# Patient Record
Sex: FEMALE | Race: WHITE | NOT HISPANIC OR LATINO | Employment: OTHER | URBAN - METROPOLITAN AREA
[De-identification: names, ages, dates, MRNs, and addresses within clinical notes are randomized per-mention and may not be internally consistent; named-entity substitution may affect disease eponyms.]

---

## 2017-08-04 RX ORDER — LOVASTATIN 40 MG/1
40 TABLET ORAL NIGHTLY
Qty: 90 TABLET | Refills: 3 | Status: SHIPPED | OUTPATIENT
Start: 2017-08-04 | End: 2020-06-02

## 2017-08-04 RX ORDER — METFORMIN HYDROCHLORIDE 500 MG/1
500 TABLET ORAL 2 TIMES DAILY WITH MEALS
Qty: 180 TABLET | Refills: 3 | Status: SHIPPED | OUTPATIENT
Start: 2017-08-04 | End: 2020-06-02

## 2017-08-04 RX ORDER — LISINOPRIL 30 MG/1
30 TABLET ORAL DAILY
Qty: 90 TABLET | Refills: 3 | Status: SHIPPED | OUTPATIENT
Start: 2017-08-04 | End: 2020-06-02

## 2017-08-04 RX ORDER — MELOXICAM 15 MG/1
15 TABLET ORAL DAILY
Qty: 30 TABLET | Refills: 1 | Status: SHIPPED | OUTPATIENT
Start: 2017-08-04 | End: 2017-09-03

## 2020-02-08 ENCOUNTER — HOSPITAL ENCOUNTER (EMERGENCY)
Facility: HOSPITAL | Age: 55
Discharge: HOME | End: 2020-02-09
Attending: EMERGENCY MEDICINE

## 2020-02-08 DIAGNOSIS — R05.9 COUGH: ICD-10-CM

## 2020-02-08 DIAGNOSIS — J10.1 INFLUENZA A: Primary | ICD-10-CM

## 2020-02-08 LAB
INFLUENZA A, MOLECULAR: POSITIVE
INFLUENZA B, MOLECULAR: NEGATIVE
SPECIMEN SOURCE: ABNORMAL

## 2020-02-08 PROCEDURE — 99284 EMERGENCY DEPT VISIT MOD MDM: CPT | Mod: 25

## 2020-02-08 PROCEDURE — 25000003 PHARM REV CODE 250: Performed by: EMERGENCY MEDICINE

## 2020-02-08 PROCEDURE — 87502 INFLUENZA DNA AMP PROBE: CPT

## 2020-02-08 RX ORDER — ACETAMINOPHEN 325 MG/1
650 TABLET ORAL
Status: COMPLETED | OUTPATIENT
Start: 2020-02-08 | End: 2020-02-08

## 2020-02-08 RX ADMIN — ACETAMINOPHEN 650 MG: 325 TABLET ORAL at 10:02

## 2020-02-09 VITALS
HEART RATE: 90 BPM | RESPIRATION RATE: 17 BRPM | SYSTOLIC BLOOD PRESSURE: 129 MMHG | OXYGEN SATURATION: 98 % | TEMPERATURE: 99 F | BODY MASS INDEX: 34.38 KG/M2 | WEIGHT: 194 LBS | HEIGHT: 63 IN | DIASTOLIC BLOOD PRESSURE: 72 MMHG

## 2020-02-09 RX ORDER — ONDANSETRON 4 MG/1
4 TABLET, ORALLY DISINTEGRATING ORAL EVERY 8 HOURS PRN
Qty: 9 TABLET | Refills: 0 | Status: SHIPPED | OUTPATIENT
Start: 2020-02-09 | End: 2020-02-12

## 2020-02-09 RX ORDER — PROMETHAZINE HYDROCHLORIDE AND DEXTROMETHORPHAN HYDROBROMIDE 6.25; 15 MG/5ML; MG/5ML
5 SYRUP ORAL EVERY 6 HOURS PRN
Qty: 1 BOTTLE | Refills: 0 | Status: SHIPPED | OUTPATIENT
Start: 2020-02-09 | End: 2020-06-02

## 2020-02-09 NOTE — ED PROVIDER NOTES
Encounter Date: 2/8/2020    SCRIBE #1 NOTE: I, Ariadna Billings, am scribing for, and in the presence of,  Dr. Choudhury. I have scribed the entire note.       History     Chief Complaint   Patient presents with    Fever     Patient presents to the ED with reports of having fever and cough that started yesterday. States having a low grade temperature that started last night. States home temp was 100.5 prior to arrival. Treated with 600 mg of ibuprofen at 7:30 pm tonight. States she is currently taking augmentin for a cyst removal recently and is almost done with medication.     Cough     This is a 54 y.o. female who  has no past medical history on file. presents with chief complaint of non productive cough and fever with onset yesterday. Patient reports taking her temperature at home last night of 99.8F. She notes taking Ibuprofen which improved symptoms. Prior to arrival, patient had a temperature of 100.5F and treated it with 600 mg of Ibuprofen. She also notes having chest pain, back pain and throat pain, which she describes as burning sensation, secondary to cough. Patient has taken Tessalon perles for cough with mild relief. Patient has no further complaints at this time. Patient admits to smoking a pack of cigarettes a day for the past 30 years. Of note, patient recently had a cyst removed on 02/04/20 and placed on 875 mg Augmentin for 7 days.     The history is provided by the patient.     Review of patient's allergies indicates:   Allergen Reactions    Dilaudid [hydromorphone (bulk)] Nausea And Vomiting    Doxycycline     Flagyl [metronidazole hcl]     Soma [carisoprodol]      No past medical history on file.  No past surgical history on file.  No family history on file.  Social History     Tobacco Use    Smoking status: Not on file   Substance Use Topics    Alcohol use: Not on file    Drug use: Not on file     Review of Systems   Constitutional: Positive for fever. Negative for chills and fatigue.    HENT: Negative for facial swelling, trouble swallowing and voice change.    Eyes: Negative for photophobia, pain and redness.   Respiratory: Positive for cough (non productive). Negative for choking and shortness of breath.    Cardiovascular: Negative for chest pain, palpitations and leg swelling.   Gastrointestinal: Negative for abdominal pain, diarrhea, nausea and vomiting.   Genitourinary: Negative for difficulty urinating, frequency and urgency.   Musculoskeletal: Positive for myalgias. Negative for back pain, neck pain and neck stiffness.   Neurological: Negative for seizures, speech difficulty, light-headedness, numbness and headaches.   All other systems reviewed and are negative.      Physical Exam     Initial Vitals [02/08/20 2054]   BP Pulse Resp Temp SpO2   138/78 100 20 (!) 100.5 °F (38.1 °C) 95 %      MAP       --         Physical Exam    Nursing note and vitals reviewed.  Constitutional: She appears well-developed and well-nourished. No distress.   Non toxic appearing   No acute distress noted    HENT:   Head: Normocephalic and atraumatic.   Right Ear: Tympanic membrane and external ear normal. No drainage.   Left Ear: Tympanic membrane and external ear normal. No drainage.   Nose: Mucosal edema present. Right sinus exhibits no maxillary sinus tenderness and no frontal sinus tenderness. Left sinus exhibits no frontal sinus tenderness.   Mouth/Throat: Uvula is midline, oropharynx is clear and moist and mucous membranes are normal. No oropharyngeal exudate, posterior oropharyngeal edema, posterior oropharyngeal erythema or tonsillar abscesses.   Nasal congestion. Mild erythema to posterior oropharynx.    Eyes: Conjunctivae and EOM are normal. Pupils are equal, round, and reactive to light.   Neck: Normal range of motion. Neck supple.   Cardiovascular: Normal rate, regular rhythm, normal heart sounds and intact distal pulses.   Pulmonary/Chest: No respiratory distress. She has no wheezes. She has no  "rhonchi. She has no rales.   Coarse breath sounds. No wheezing.    Abdominal: Soft. Bowel sounds are normal. She exhibits no distension. There is no tenderness.   Musculoskeletal: Normal range of motion. She exhibits no edema or tenderness.   Neurological: She is alert and oriented to person, place, and time. She has normal strength. No cranial nerve deficit.   Skin: Skin is warm and dry. Capillary refill takes less than 2 seconds.         ED Course   Procedures  Labs Reviewed   INFLUENZA A & B BY MOLECULAR - Abnormal; Notable for the following components:       Result Value    Influenza A, Molecular Positive (*)     All other components within normal limits          X-Rays:   Independently Interpreted Readings:   Other Readings:  Reviewed by myself, read by radiology.     Imaging Results          X-Ray Chest PA And Lateral (Final result)  Result time 02/09/20 00:13:22    Final result by Tequila Guajardo MD (02/09/20 00:13:22)                 Impression:      No acute intrathoracic abnormality.      Electronically signed by: Tequila Guajardo  Date:    02/09/2020  Time:    00:13             Narrative:    EXAMINATION:  CHEST PA AND LATERAL    CLINICAL HISTORY:  Cough    TECHNIQUE:  PA and lateral chest radiograph    COMPARISON:  <None.>    FINDINGS:  The cardiac silhouette is within normal limits.   There is no focal consolidation, pneumothorax, or pleural effusion.                              Medical Decision Making:   Clinical Tests:   Lab Tests: Ordered and Reviewed  Radiological Study: Ordered and Reviewed  ED Management:  - CXR negative for acute cardiopulmonary process per my interpretation, final radiology read  - Influenza A & B positive for Influenza A  - Pt out of so-called "window"" for treatment with antiviral  - Will treat symptomatically with rx for cough suppressant; will also recommend rest, fluid hydration  - VSS throughout ED; pt is non-toxic   - Pt stable for discharge  - No further intervention " is indicated at this time after having taken into account the patient's history, physical exam findings, and empirical and objective data obtained during the patient's emergency department workup.   - The patient is at low risk for an emergent medical condition at this time, and I am of the belief that that it is safe to discharge the patient from the emergency department.   - The patient is instructed to follow up as outpatient as indicated on the discharge paperwork.    - I have discussed the specifics of the workup with the patient and the patient has verbalized understanding of the details of the workup, the diagnosis, the treatment plan, and the need for outpatient follow-up.    - Although the patient has no emergent etiology today this does not preclude the development of an emergent condition so, in addition, I have advised the patient that they can return to the ED and/or activate EMS at any time with worsening of their symptoms, change of their symptoms, or with any other medical complaint.    - The patient remained comfortable and stable during their visit in the ED.    - Discharge and follow-up instructions discussed with the patient who expressed understanding and willingness to comply with my recommendations.  - Results of all emergency department tests  discussed thoroughly with patient; all patient questions answered; pt in agreement with plan  - Pt instructed to follow up with PCP in 2-3 days for recheck of today's complaints  - Pt given strict emergency department return precautions for any new or worsening of symptoms  - Pt discharged from the emergency department in stable condition, in no acute distress                                     Clinical Impression:       ICD-10-CM ICD-9-CM   1. Influenza A J10.1 487.1   2. Cough R05 786.2         IRicci,  personally performed the services described in this documentation. All medical record entries made by the scribe were at my direction and  in my presence.  I have reviewed the chart and agree that the record reflects my personal performance and is accurate and complete. Ricci Choudhury M.D. 1:34 AM02/09/2020                 Ricci Choudhury MD  02/09/20 0135

## 2020-06-02 ENCOUNTER — CLINICAL SUPPORT (OUTPATIENT)
Dept: PRIMARY CARE CLINIC | Facility: CLINIC | Age: 55
End: 2020-06-02
Payer: MEDICARE

## 2020-06-02 ENCOUNTER — OFFICE VISIT (OUTPATIENT)
Dept: PRIMARY CARE CLINIC | Facility: CLINIC | Age: 55
End: 2020-06-02
Payer: MEDICARE

## 2020-06-02 VITALS
SYSTOLIC BLOOD PRESSURE: 146 MMHG | WEIGHT: 189.06 LBS | HEIGHT: 63 IN | OXYGEN SATURATION: 98 % | HEART RATE: 86 BPM | TEMPERATURE: 99 F | RESPIRATION RATE: 18 BRPM | DIASTOLIC BLOOD PRESSURE: 80 MMHG | BODY MASS INDEX: 33.5 KG/M2

## 2020-06-02 DIAGNOSIS — E11.9 TYPE 2 DIABETES MELLITUS WITHOUT COMPLICATION, WITHOUT LONG-TERM CURRENT USE OF INSULIN: Primary | ICD-10-CM

## 2020-06-02 DIAGNOSIS — Z13.6 ENCOUNTER FOR LIPID SCREENING FOR CARDIOVASCULAR DISEASE: ICD-10-CM

## 2020-06-02 DIAGNOSIS — R51.9 NONINTRACTABLE HEADACHE, UNSPECIFIED CHRONICITY PATTERN, UNSPECIFIED HEADACHE TYPE: ICD-10-CM

## 2020-06-02 DIAGNOSIS — Z12.31 ENCOUNTER FOR SCREENING MAMMOGRAM FOR BREAST CANCER: ICD-10-CM

## 2020-06-02 DIAGNOSIS — Z13.220 ENCOUNTER FOR LIPID SCREENING FOR CARDIOVASCULAR DISEASE: ICD-10-CM

## 2020-06-02 DIAGNOSIS — Z20.822 EXPOSURE TO COVID-19 VIRUS: ICD-10-CM

## 2020-06-02 DIAGNOSIS — I10 ESSENTIAL HYPERTENSION: ICD-10-CM

## 2020-06-02 DIAGNOSIS — Z12.11 ENCOUNTER FOR SCREENING COLONOSCOPY: ICD-10-CM

## 2020-06-02 DIAGNOSIS — Z72.0 TOBACCO USE: ICD-10-CM

## 2020-06-02 DIAGNOSIS — E11.9 TYPE 2 DIABETES MELLITUS WITHOUT COMPLICATION, WITHOUT LONG-TERM CURRENT USE OF INSULIN: ICD-10-CM

## 2020-06-02 DIAGNOSIS — M51.16 LUMBAR DISC DISEASE WITH RADICULOPATHY: ICD-10-CM

## 2020-06-02 LAB
ALBUMIN SERPL BCP-MCNC: 4.9 G/DL (ref 3.5–5.2)
ALBUMIN/CREAT UR: 200 UG/MG (ref 0–30)
ALP SERPL-CCNC: 84 U/L (ref 38–126)
ALT SERPL W/O P-5'-P-CCNC: 23 U/L (ref 14–54)
ANION GAP SERPL CALC-SCNC: 14 MMOL/L (ref 8–16)
AST SERPL-CCNC: 20 U/L (ref 15–41)
BASOPHILS # BLD AUTO: 0.1 K/UL (ref 0–0.2)
BASOPHILS NFR BLD: 0.8 % (ref 0–1.9)
BILIRUB SERPL-MCNC: 0.6 MG/DL (ref 0.3–1.2)
BILIRUB SERPL-MCNC: ABNORMAL MG/DL
BLOOD URINE, POC: ABNORMAL
BUN SERPL-MCNC: 14 MG/DL (ref 6–20)
CALCIUM SERPL-MCNC: 10.1 MG/DL (ref 8.6–10)
CHLORIDE SERPL-SCNC: 97 MMOL/L (ref 101–111)
CHOLEST SERPL-MCNC: 219 MG/DL (ref 80–200)
CHOLEST/HDLC SERPL: 7.3 {RATIO} (ref 2–5)
CO2 SERPL-SCNC: 26 MMOL/L (ref 23–29)
COLOR, POC UA: YELLOW
CREAT SERPL-MCNC: 0.8 MG/DL (ref 0.5–1.4)
CREAT UR-MCNC: 92 MG/DL (ref 15–325)
DIFFERENTIAL METHOD: NORMAL
EOSINOPHIL # BLD AUTO: 0.3 K/UL (ref 0–0.5)
EOSINOPHIL NFR BLD: 3.3 % (ref 0–8)
ERYTHROCYTE [DISTWIDTH] IN BLOOD BY AUTOMATED COUNT: 13.1 % (ref 11.5–14.5)
EST. GFR  (AFRICAN AMERICAN): >60 ML/MIN/1.73 M^2
EST. GFR  (NON AFRICAN AMERICAN): >60 ML/MIN/1.73 M^2
ESTIMATED AVG GLUCOSE: 206 MG/DL (ref 68–131)
GLUCOSE SERPL-MCNC: 203 MG/DL (ref 74–118)
GLUCOSE UR QL STRIP: NORMAL
HBA1C MFR BLD HPLC: 8.8 % (ref 4–5.6)
HCT VFR BLD AUTO: 44.9 % (ref 37–48.5)
HDLC SERPL-MCNC: 30 MG/DL (ref 40–75)
HDLC SERPL: 13.7 % (ref 20–50)
HGB BLD-MCNC: 15.5 G/DL (ref 12–16)
KETONES UR QL STRIP: ABNORMAL
LDLC SERPL CALC-MCNC: ABNORMAL MG/DL
LEUKOCYTE ESTERASE URINE, POC: ABNORMAL
LYMPHOCYTES # BLD AUTO: 2.7 K/UL (ref 1–4.8)
LYMPHOCYTES NFR BLD: 32.4 % (ref 18–48)
MCH RBC QN AUTO: 30.4 PG (ref 27–31)
MCHC RBC AUTO-ENTMCNC: 34.4 G/DL (ref 32–36)
MCV RBC AUTO: 88 FL (ref 82–98)
MICROALBUMIN UR DL<=1MG/L-MCNC: 184 UG/ML
MONOCYTES # BLD AUTO: 0.3 K/UL (ref 0.3–1)
MONOCYTES NFR BLD: 4.1 % (ref 4–15)
NEUTROPHILS # BLD AUTO: 4.9 K/UL (ref 1.8–7.7)
NEUTROPHILS NFR BLD: 59.4 % (ref 38–73)
NITRITE, POC UA: ABNORMAL
NONHDLC SERPL-MCNC: 189 MG/DL
PH, POC UA: 6
PLATELET # BLD AUTO: 247 K/UL (ref 150–350)
PMV BLD AUTO: 9.4 FL (ref 9.2–12.9)
POTASSIUM SERPL-SCNC: 3.8 MMOL/L (ref 3.5–5.1)
PROT SERPL-MCNC: 7.9 G/DL (ref 6–8.4)
PROTEIN, POC: ABNORMAL
RBC # BLD AUTO: 5.08 M/UL (ref 4–5.4)
SARS-COV-2 IGG SERPLBLD QL IA.RAPID: NEGATIVE
SODIUM SERPL-SCNC: 137 MMOL/L (ref 136–145)
SPECIFIC GRAVITY, POC UA: 1.01
TRIGL SERPL-MCNC: 562 MG/DL (ref 30–150)
TSH SERPL DL<=0.005 MIU/L-ACNC: 3.47 UIU/ML (ref 0.45–5.33)
UROBILINOGEN, POC UA: NORMAL
WBC # BLD AUTO: 8.3 K/UL (ref 3.9–12.7)

## 2020-06-02 PROCEDURE — 99999 PR PBB SHADOW E&M-EST. PATIENT-LVL V: CPT | Mod: PBBFAC,,, | Performed by: INTERNAL MEDICINE

## 2020-06-02 PROCEDURE — 81002 POCT URINE DIPSTICK WITHOUT MICROSCOPE: ICD-10-PCS | Mod: S$GLB,,, | Performed by: INTERNAL MEDICINE

## 2020-06-02 PROCEDURE — 80061 LIPID PANEL: CPT

## 2020-06-02 PROCEDURE — 93010 EKG 12-LEAD: ICD-10-PCS | Mod: S$GLB,,, | Performed by: INTERNAL MEDICINE

## 2020-06-02 PROCEDURE — 36415 PR COLLECTION VENOUS BLOOD,VENIPUNCTURE: ICD-10-PCS | Mod: S$GLB,,, | Performed by: INTERNAL MEDICINE

## 2020-06-02 PROCEDURE — 36415 COLL VENOUS BLD VENIPUNCTURE: CPT | Mod: S$GLB,,, | Performed by: INTERNAL MEDICINE

## 2020-06-02 PROCEDURE — 99214 OFFICE O/P EST MOD 30 MIN: CPT | Mod: 25,S$GLB,, | Performed by: INTERNAL MEDICINE

## 2020-06-02 PROCEDURE — 93010 ELECTROCARDIOGRAM REPORT: CPT | Mod: S$GLB,,, | Performed by: INTERNAL MEDICINE

## 2020-06-02 PROCEDURE — 3008F PR BODY MASS INDEX (BMI) DOCUMENTED: ICD-10-PCS | Mod: CPTII,S$GLB,, | Performed by: INTERNAL MEDICINE

## 2020-06-02 PROCEDURE — 84443 ASSAY THYROID STIM HORMONE: CPT

## 2020-06-02 PROCEDURE — 83036 HEMOGLOBIN GLYCOSYLATED A1C: CPT

## 2020-06-02 PROCEDURE — 81002 URINALYSIS NONAUTO W/O SCOPE: CPT | Mod: S$GLB,,, | Performed by: INTERNAL MEDICINE

## 2020-06-02 PROCEDURE — 80053 COMPREHEN METABOLIC PANEL: CPT

## 2020-06-02 PROCEDURE — 99214 PR OFFICE/OUTPT VISIT, EST, LEVL IV, 30-39 MIN: ICD-10-PCS | Mod: 25,S$GLB,, | Performed by: INTERNAL MEDICINE

## 2020-06-02 PROCEDURE — 3052F HG A1C>EQUAL 8.0%<EQUAL 9.0%: CPT | Mod: CPTII,S$GLB,, | Performed by: INTERNAL MEDICINE

## 2020-06-02 PROCEDURE — 3052F PR MOST RECENT HEMOGLOBIN A1C LEVEL 8.0 - < 9.0%: ICD-10-PCS | Mod: CPTII,S$GLB,, | Performed by: INTERNAL MEDICINE

## 2020-06-02 PROCEDURE — 99499 RISK ADDL DX/OHS AUDIT: ICD-10-PCS | Mod: S$GLB,,, | Performed by: INTERNAL MEDICINE

## 2020-06-02 PROCEDURE — 3077F SYST BP >= 140 MM HG: CPT | Mod: CPTII,S$GLB,, | Performed by: INTERNAL MEDICINE

## 2020-06-02 PROCEDURE — 99999 PR PBB SHADOW E&M-EST. PATIENT-LVL V: ICD-10-PCS | Mod: PBBFAC,,, | Performed by: INTERNAL MEDICINE

## 2020-06-02 PROCEDURE — 3079F PR MOST RECENT DIASTOLIC BLOOD PRESSURE 80-89 MM HG: ICD-10-PCS | Mod: CPTII,S$GLB,, | Performed by: INTERNAL MEDICINE

## 2020-06-02 PROCEDURE — 3079F DIAST BP 80-89 MM HG: CPT | Mod: CPTII,S$GLB,, | Performed by: INTERNAL MEDICINE

## 2020-06-02 PROCEDURE — 3008F BODY MASS INDEX DOCD: CPT | Mod: CPTII,S$GLB,, | Performed by: INTERNAL MEDICINE

## 2020-06-02 PROCEDURE — 93005 ELECTROCARDIOGRAM TRACING: CPT | Mod: S$GLB,,, | Performed by: INTERNAL MEDICINE

## 2020-06-02 PROCEDURE — 82043 UR ALBUMIN QUANTITATIVE: CPT

## 2020-06-02 PROCEDURE — 85025 COMPLETE CBC W/AUTO DIFF WBC: CPT

## 2020-06-02 PROCEDURE — 3077F PR MOST RECENT SYSTOLIC BLOOD PRESSURE >= 140 MM HG: ICD-10-PCS | Mod: CPTII,S$GLB,, | Performed by: INTERNAL MEDICINE

## 2020-06-02 PROCEDURE — 99499 UNLISTED E&M SERVICE: CPT | Mod: S$GLB,,, | Performed by: INTERNAL MEDICINE

## 2020-06-02 PROCEDURE — 86769 SARS-COV-2 COVID-19 ANTIBODY: CPT

## 2020-06-02 PROCEDURE — 93005 EKG 12-LEAD: ICD-10-PCS | Mod: S$GLB,,, | Performed by: INTERNAL MEDICINE

## 2020-06-02 RX ORDER — PANTOPRAZOLE SODIUM 40 MG/1
1 TABLET, DELAYED RELEASE ORAL NIGHTLY
COMMUNITY
Start: 2020-05-11 | End: 2021-03-02

## 2020-06-02 RX ORDER — MELOXICAM 15 MG/1
15 TABLET ORAL DAILY
COMMUNITY
Start: 2020-05-11

## 2020-06-02 RX ORDER — METFORMIN HYDROCHLORIDE 750 MG/1
750 TABLET, EXTENDED RELEASE ORAL 2 TIMES DAILY
COMMUNITY
Start: 2020-04-14

## 2020-06-02 RX ORDER — LATANOPROST 50 UG/ML
1 SOLUTION/ DROPS OPHTHALMIC DAILY
COMMUNITY

## 2020-06-02 RX ORDER — BUTALBITAL, ACETAMINOPHEN AND CAFFEINE 50; 325; 40 MG/1; MG/1; MG/1
1 TABLET ORAL 2 TIMES DAILY PRN
Qty: 30 TABLET | Refills: 0 | Status: SHIPPED | OUTPATIENT
Start: 2020-06-02 | End: 2020-07-02

## 2020-06-02 RX ORDER — LANCETS
EACH MISCELLANEOUS
Qty: 100 EACH | Refills: 5 | Status: SHIPPED | OUTPATIENT
Start: 2020-06-02 | End: 2021-10-18 | Stop reason: SDUPTHER

## 2020-06-02 RX ORDER — IBUPROFEN 100 MG/5ML
1000 SUSPENSION, ORAL (FINAL DOSE FORM) ORAL DAILY
COMMUNITY

## 2020-06-02 RX ORDER — GLIPIZIDE 10 MG/1
20 TABLET, FILM COATED, EXTENDED RELEASE ORAL 2 TIMES DAILY
COMMUNITY

## 2020-06-02 RX ORDER — HYDROCHLOROTHIAZIDE 12.5 MG/1
12.5 TABLET ORAL NIGHTLY
COMMUNITY
Start: 2020-05-11

## 2020-06-02 RX ORDER — SIMVASTATIN 40 MG/1
40 TABLET, FILM COATED ORAL NIGHTLY
COMMUNITY
Start: 2020-04-14

## 2020-06-02 RX ORDER — INSULIN PUMP SYRINGE, 3 ML
EACH MISCELLANEOUS
Qty: 1 EACH | Refills: 0 | Status: SHIPPED | OUTPATIENT
Start: 2020-06-02 | End: 2021-06-02

## 2020-06-02 RX ORDER — ESOMEPRAZOLE MAGNESIUM 40 MG/1
1 CAPSULE, DELAYED RELEASE ORAL DAILY
COMMUNITY
Start: 2020-04-24

## 2020-06-02 NOTE — PROGRESS NOTES
Subjective:       Patient ID: Libby Gayle is a 54 y.o. female.    Chief Complaint: Establish Care    HPI   patient was a former patient of the clinic she has history diabetes mellitus hypertension chronic lower back pain with radiculopathy history of headache not relieved with Tylenol and ibuprofen headache has been bother her in the last few days no nausea vomiting photophobia for her lower back pain she had epidural injection without any relief and she does not want any surgical intervention she has been seeing orthopedic for her lower back pain and also follow-up for right knee surgery left wrist surgery and right hand surgery she currently denies short of breath chest pain dyspnea with exertion nausea vomiting no change in bowel habit or urination she still smoking a pack a day no EtOH  Review of Systems   Constitutional: Negative for unexpected weight change.   HENT: Negative for congestion and postnasal drip.    Eyes: Negative for visual disturbance.   Respiratory: Negative for shortness of breath.    Cardiovascular: Negative for chest pain and palpitations.   Gastrointestinal: Negative for abdominal pain and constipation.   Genitourinary: Negative for difficulty urinating.   Musculoskeletal: Positive for arthralgias and back pain.   Skin: Negative for rash.   Neurological: Positive for headaches.   Psychiatric/Behavioral: Negative for dysphoric mood.       Objective:      Physical Exam   Constitutional: She is oriented to person, place, and time. She appears well-developed and well-nourished. No distress.   HENT:   Head: Normocephalic and atraumatic.   Right Ear: External ear normal.   Left Ear: External ear normal.   Nose: Nose normal.   Mouth/Throat: Oropharynx is clear and moist. No oropharyngeal exudate.   Eyes: Pupils are equal, round, and reactive to light. Conjunctivae and EOM are normal. Right eye exhibits no discharge. Left eye exhibits no discharge.   Neck: Normal range of motion. Neck supple.  No thyromegaly present.   Cardiovascular: Normal rate, regular rhythm, normal heart sounds and intact distal pulses. Exam reveals no gallop and no friction rub.   No murmur heard.  Pulmonary/Chest: Effort normal and breath sounds normal. No respiratory distress. She has no wheezes. She has no rales. She exhibits no tenderness.   Abdominal: Soft. Bowel sounds are normal. She exhibits no distension. There is no tenderness. There is no rebound and no guarding.   Musculoskeletal: Normal range of motion. She exhibits no edema, tenderness or deformity.   Lymphadenopathy:     She has no cervical adenopathy.   Neurological: She is alert and oriented to person, place, and time.   Skin: Skin is warm and dry. Capillary refill takes less than 2 seconds. No rash noted. No erythema.   Psychiatric: She has a normal mood and affect. Judgment and thought content normal.   Nursing note and vitals reviewed.      Assessment:       1. Type 2 diabetes mellitus without complication, without long-term current use of insulin    2. Encounter for lipid screening for cardiovascular disease    3. Encounter for screening mammogram for breast cancer    4. Encounter for screening colonoscopy    5. Lumbar disc disease with radiculopathy    6. Exposure to Covid-19 Virus    7. Nonintractable headache, unspecified chronicity pattern, unspecified headache type    8. Essential hypertension    9. Tobacco use        Plan:       Type 2 diabetes mellitus without complication, without long-term current use of insulin  -     EKG 12-lead; Future  -     POCT urine dipstick without microscope  -     CBC auto differential; Future; Expected date: 06/02/2020  -     Comprehensive metabolic panel; Future; Expected date: 06/02/2020  -     TSH; Future; Expected date: 06/02/2020  -     Hemoglobin A1C; Future; Expected date: 06/02/2020  -     Microalbumin/creatinine urine ratio; Future; Expected date: 06/02/2020  -     blood-glucose meter kit; To check BG twice daily, to  use with insurance preferred meter  Dispense: 1 each; Refill: 0  -     lancets Misc; To check BG twice daily, to use with insurance preferred meter  Dispense: 100 each; Refill: 5  -     blood sugar diagnostic Strp; To check BG twice daily, to use with insurance preferred meter  Dispense: 100 strip; Refill: 5    Encounter for lipid screening for cardiovascular disease  -     Lipid Panel; Future; Expected date: 06/02/2020    Encounter for screening mammogram for breast cancer  -     Cancel: Mammo Digital Screening Bilat without CA; Future; Expected date: 06/16/2020  -     Mammo Digital Screening Bilat without CA; Future; Expected date: 06/02/2020    Encounter for screening colonoscopy  -     Fecal Immunochemical Test (iFOBT); Future; Expected date: 06/02/2020    Lumbar disc disease with radiculopathy    Exposure to Covid-19 Virus  -     COVID-19 (SARS CoV-2) IgG Antibody; Future; Expected date: 06/02/2020    Nonintractable headache, unspecified chronicity pattern, unspecified headache type  -     butalbital-acetaminophen-caffeine -40 mg (FIORICET, ESGIC) -40 mg per tablet; Take 1 tablet by mouth 2 (two) times daily as needed for Headaches.  Dispense: 30 tablet; Refill: 0    Essential hypertension  Comments:  Not well control yet will monitor and recheck before adjust medication    Tobacco use  -     X-Ray Chest PA And Lateral; Future; Expected date: 06/03/2020  -     Ambulatory referral/consult to Smoking Cessation Program; Future; Expected date: 06/10/2020

## 2020-06-03 ENCOUNTER — TELEPHONE (OUTPATIENT)
Dept: PRIMARY CARE CLINIC | Facility: CLINIC | Age: 55
End: 2020-06-03

## 2020-06-03 DIAGNOSIS — E78.5 HYPERLIPIDEMIA, UNSPECIFIED HYPERLIPIDEMIA TYPE: ICD-10-CM

## 2020-06-03 DIAGNOSIS — E11.9 TYPE 2 DIABETES MELLITUS WITHOUT COMPLICATION, WITHOUT LONG-TERM CURRENT USE OF INSULIN: Primary | ICD-10-CM

## 2020-06-03 PROBLEM — M51.16 LUMBAR DISC DISEASE WITH RADICULOPATHY: Status: ACTIVE | Noted: 2020-06-03

## 2020-06-03 PROBLEM — Z72.0 TOBACCO USE: Status: ACTIVE | Noted: 2020-06-03

## 2020-06-03 NOTE — TELEPHONE ENCOUNTER
Pt is on Simvstatin 40mg did you want to change to Lipitor and what dose of Fish oil is she to take?

## 2020-06-03 NOTE — TELEPHONE ENCOUNTER
Notified patient of all results. States she can not take fish oil because of the taste they make her throw up. She can take flax seed oil in place of the fish oil if you would like.

## 2020-06-03 NOTE — TELEPHONE ENCOUNTER
----- Message from Jose De Souza MD sent at 6/3/2020  7:22 AM CDT -----  Please call the patient regarding her blood test high hemoglobin A1c 8.8 high glucose high triglyceride high cholesterol will add Tradjenta 5 mg a day for diabetes and make sure patient is on her 1800 calorie ADA diet and physical exercise 30 min a day to improve her diabetes she need fish oil twice a day and start on Lipitor 20 mg a day for high cholesterol high triglyceride and she need repeat blood test in 3 months CMP hemoglobin A1c and lipid profile and referral to diabetic teaching

## 2020-06-03 NOTE — TELEPHONE ENCOUNTER
Spoke with patient, called to let her know that Dr. De Souza states it would be okay to try the flax seed oil and recheck labs in three months. Patient states okay and asks when the tradjenta will be sent over. Advised her that it was sent over this morning to [facility]. Patient starts to yell and says why dont dilan pay attention to my chart this is whats wrong with yall. Advised her it must have been sent there by accident. Offered to have him resend for her. Denied the resent states she will start seeing Dr. Villagomez on Monday for her medications. Patient then hung up.

## 2020-06-03 NOTE — TELEPHONE ENCOUNTER
----- Message from Paula Farrell sent at 6/3/2020  2:41 PM CDT -----  Contact: Patient  Type: Needs Medical Advice    Who Called:  Libby, patient  Symptoms (please be specific):  N/A  How long has patient had these symptoms:  N/A  Pharmacy name and phone #:  N/A  Best Call Back Number: [phone]  Additional Information: Calling send a copy of her labs to Dr. Connor, fax [phone]. Thanks.

## 2020-06-03 NOTE — TELEPHONE ENCOUNTER
Fish oil around 300mg twice a day and can continue simvasatin and recheck in 3 mos if notbetter then change to lipitor higher dose 40 mg a day

## 2020-06-11 ENCOUNTER — TELEPHONE (OUTPATIENT)
Dept: PRIMARY CARE CLINIC | Facility: CLINIC | Age: 55
End: 2020-06-11

## 2020-06-11 NOTE — TELEPHONE ENCOUNTER
----- Message from Netta Jensen sent at 6/11/2020  9:07 AM CDT -----  Contact: self/[phone]  Name of test: mammogram results    Date of test: 06/08/20    Ordering provider: Dr De Souza    Where was the test performed:     Comments: patient would like a courtesy call.

## 2020-06-11 NOTE — TELEPHONE ENCOUNTER
Spoke with pt. Had mammogram done at [facility] - will look for results and have MD review. Will call her once reviewed by the physician

## 2020-09-16 ENCOUNTER — PATIENT OUTREACH (OUTPATIENT)
Dept: ADMINISTRATIVE | Facility: HOSPITAL | Age: 55
End: 2020-09-16

## 2020-09-16 DIAGNOSIS — Z11.59 NEED FOR HEPATITIS C SCREENING TEST: Primary | ICD-10-CM

## 2020-09-16 NOTE — PROGRESS NOTES
Health Maintenance Due   Topic Date Due    Hepatitis C Screening  1965    Foot Exam  10/17/1977    HIV Screening  10/17/1982    Pneumococcal Vaccine (Medium Risk) (1 of 1 - PPSV23) 10/17/1986    Shingles Vaccine (1 of 2) 10/17/2017    Colorectal Cancer Screening  10/17/2017    Influenza Vaccine (1) 08/01/2020    TETANUS VACCINE  09/05/2020   Chart complete. Letter sent to patient.

## 2020-09-23 ENCOUNTER — PATIENT OUTREACH (OUTPATIENT)
Dept: ADMINISTRATIVE | Facility: HOSPITAL | Age: 55
End: 2020-09-23

## 2020-09-24 NOTE — PROGRESS NOTES
Chart Auditing Tool   Topic Results    Reconcile Immunizations No Account Noted    Review Health Maintenance Topics Completed    Care Team Updated    Appointments N/A    Care Coordination Note Completed    Outreach Letter sent to patient    Care Everywhere completed    Completion of Review Done     Chart Audit Complete: Notes:  Will need to try a telephonic outreach to complete- Message sent to CCC-  Parul Mcguire LPN  Lead Clinical Care Coordinator   [facility]

## 2020-09-25 ENCOUNTER — TELEPHONE (OUTPATIENT)
Dept: ADMINISTRATIVE | Facility: HOSPITAL | Age: 55
End: 2020-09-25

## 2020-09-25 ENCOUNTER — TELEPHONE (OUTPATIENT)
Dept: PRIMARY CARE CLINIC | Facility: CLINIC | Age: 55
End: 2020-09-25

## 2020-09-25 NOTE — TELEPHONE ENCOUNTER
I called the patient about her over due health maintenance. L/ M that I called and sent a letter on 9/16/2020

## 2020-09-25 NOTE — TELEPHONE ENCOUNTER
The patient returned my call through the call center. She refuse the fit kit. The patient stated to Sammi Lowe that she is not going to do it and that we just want to make her insurance go up.

## 2021-03-01 LAB
A1C: 7.3
CHOL/HDLC RATIO: 6.8
CHOLESTEROL, TOTAL: 210
DIRECT LDL: 110
HDLC SERPL-MCNC: 31 MG/DL
NON HDL CHOL (CALC): 179
TRIGL SERPL-MCNC: 524 MG/DL

## 2021-03-02 ENCOUNTER — OFFICE VISIT (OUTPATIENT)
Dept: PRIMARY CARE CLINIC | Facility: CLINIC | Age: 56
End: 2021-03-02
Payer: MEDICARE

## 2021-03-02 VITALS
HEIGHT: 63 IN | WEIGHT: 186.06 LBS | DIASTOLIC BLOOD PRESSURE: 72 MMHG | TEMPERATURE: 99 F | BODY MASS INDEX: 32.97 KG/M2 | OXYGEN SATURATION: 95 % | HEART RATE: 87 BPM | SYSTOLIC BLOOD PRESSURE: 124 MMHG | RESPIRATION RATE: 18 BRPM

## 2021-03-02 DIAGNOSIS — M51.16 LUMBAR DISC DISEASE WITH RADICULOPATHY: ICD-10-CM

## 2021-03-02 DIAGNOSIS — Z12.11 ENCOUNTER FOR SCREENING COLONOSCOPY: ICD-10-CM

## 2021-03-02 DIAGNOSIS — E66.3 OVERWEIGHT: ICD-10-CM

## 2021-03-02 DIAGNOSIS — E11.9 ENCOUNTER FOR DIABETIC FOOT EXAM: ICD-10-CM

## 2021-03-02 DIAGNOSIS — Z23 ENCOUNTER FOR VACCINATION: ICD-10-CM

## 2021-03-02 DIAGNOSIS — Z11.4 SCREENING FOR HIV (HUMAN IMMUNODEFICIENCY VIRUS): ICD-10-CM

## 2021-03-02 DIAGNOSIS — E78.5 HYPERLIPIDEMIA, UNSPECIFIED HYPERLIPIDEMIA TYPE: ICD-10-CM

## 2021-03-02 DIAGNOSIS — Z72.0 TOBACCO USE: ICD-10-CM

## 2021-03-02 DIAGNOSIS — E11.9 TYPE 2 DIABETES MELLITUS WITHOUT COMPLICATION, WITHOUT LONG-TERM CURRENT USE OF INSULIN: Primary | ICD-10-CM

## 2021-03-02 DIAGNOSIS — Z12.31 ENCOUNTER FOR SCREENING MAMMOGRAM FOR BREAST CANCER: ICD-10-CM

## 2021-03-02 PROCEDURE — 90732 PNEUMOCOCCAL POLYSACCHARIDE VACCINE 23-VALENT =>2YO SQ IM: ICD-10-PCS | Mod: S$GLB,,, | Performed by: INTERNAL MEDICINE

## 2021-03-02 PROCEDURE — 90732 PPSV23 VACC 2 YRS+ SUBQ/IM: CPT | Mod: S$GLB,,, | Performed by: INTERNAL MEDICINE

## 2021-03-02 PROCEDURE — 3052F PR MOST RECENT HEMOGLOBIN A1C LEVEL 8.0 - < 9.0%: ICD-10-PCS | Mod: CPTII,S$GLB,, | Performed by: INTERNAL MEDICINE

## 2021-03-02 PROCEDURE — G0009 PNEUMOCOCCAL POLYSACCHARIDE VACCINE 23-VALENT =>2YO SQ IM: ICD-10-PCS | Mod: S$GLB,,, | Performed by: INTERNAL MEDICINE

## 2021-03-02 PROCEDURE — 99499 RISK ADDL DX/OHS AUDIT: ICD-10-PCS | Mod: S$GLB,,, | Performed by: INTERNAL MEDICINE

## 2021-03-02 PROCEDURE — 99214 PR OFFICE/OUTPT VISIT, EST, LEVL IV, 30-39 MIN: ICD-10-PCS | Mod: 25,S$GLB,, | Performed by: INTERNAL MEDICINE

## 2021-03-02 PROCEDURE — 3008F BODY MASS INDEX DOCD: CPT | Mod: CPTII,S$GLB,, | Performed by: INTERNAL MEDICINE

## 2021-03-02 PROCEDURE — 3052F HG A1C>EQUAL 8.0%<EQUAL 9.0%: CPT | Mod: CPTII,S$GLB,, | Performed by: INTERNAL MEDICINE

## 2021-03-02 PROCEDURE — G0009 ADMIN PNEUMOCOCCAL VACCINE: HCPCS | Mod: S$GLB,,, | Performed by: INTERNAL MEDICINE

## 2021-03-02 PROCEDURE — 99999 PR PBB SHADOW E&M-EST. PATIENT-LVL V: ICD-10-PCS | Mod: PBBFAC,,, | Performed by: INTERNAL MEDICINE

## 2021-03-02 PROCEDURE — 99214 OFFICE O/P EST MOD 30 MIN: CPT | Mod: 25,S$GLB,, | Performed by: INTERNAL MEDICINE

## 2021-03-02 PROCEDURE — 1126F PR PAIN SEVERITY QUANTIFIED, NO PAIN PRESENT: ICD-10-PCS | Mod: S$GLB,,, | Performed by: INTERNAL MEDICINE

## 2021-03-02 PROCEDURE — 3008F PR BODY MASS INDEX (BMI) DOCUMENTED: ICD-10-PCS | Mod: CPTII,S$GLB,, | Performed by: INTERNAL MEDICINE

## 2021-03-02 PROCEDURE — 3074F SYST BP LT 130 MM HG: CPT | Mod: CPTII,S$GLB,, | Performed by: INTERNAL MEDICINE

## 2021-03-02 PROCEDURE — 3078F PR MOST RECENT DIASTOLIC BLOOD PRESSURE < 80 MM HG: ICD-10-PCS | Mod: CPTII,S$GLB,, | Performed by: INTERNAL MEDICINE

## 2021-03-02 PROCEDURE — 3074F PR MOST RECENT SYSTOLIC BLOOD PRESSURE < 130 MM HG: ICD-10-PCS | Mod: CPTII,S$GLB,, | Performed by: INTERNAL MEDICINE

## 2021-03-02 PROCEDURE — 99499 UNLISTED E&M SERVICE: CPT | Mod: S$GLB,,, | Performed by: INTERNAL MEDICINE

## 2021-03-02 PROCEDURE — 3078F DIAST BP <80 MM HG: CPT | Mod: CPTII,S$GLB,, | Performed by: INTERNAL MEDICINE

## 2021-03-02 PROCEDURE — 1126F AMNT PAIN NOTED NONE PRSNT: CPT | Mod: S$GLB,,, | Performed by: INTERNAL MEDICINE

## 2021-03-02 PROCEDURE — 99999 PR PBB SHADOW E&M-EST. PATIENT-LVL V: CPT | Mod: PBBFAC,,, | Performed by: INTERNAL MEDICINE

## 2021-03-04 ENCOUNTER — TELEPHONE (OUTPATIENT)
Dept: PRIMARY CARE CLINIC | Facility: CLINIC | Age: 56
End: 2021-03-04

## 2021-03-04 ENCOUNTER — PATIENT OUTREACH (OUTPATIENT)
Dept: ADMINISTRATIVE | Facility: HOSPITAL | Age: 56
End: 2021-03-04

## 2021-03-09 ENCOUNTER — TELEPHONE (OUTPATIENT)
Dept: PRIMARY CARE CLINIC | Facility: CLINIC | Age: 56
End: 2021-03-09

## 2021-03-09 DIAGNOSIS — R93.89 ABNORMAL CHEST X-RAY: Primary | ICD-10-CM

## 2021-09-09 ENCOUNTER — TELEPHONE (OUTPATIENT)
Dept: PRIMARY CARE CLINIC | Facility: CLINIC | Age: 56
End: 2021-09-09

## 2021-10-20 ENCOUNTER — TELEPHONE (OUTPATIENT)
Dept: PRIMARY CARE CLINIC | Facility: CLINIC | Age: 56
End: 2021-10-20

## 2021-10-21 ENCOUNTER — TELEPHONE (OUTPATIENT)
Dept: PRIMARY CARE CLINIC | Facility: CLINIC | Age: 56
End: 2021-10-21

## 2022-01-25 ENCOUNTER — PES CALL (OUTPATIENT)
Dept: ADMINISTRATIVE | Facility: CLINIC | Age: 57
End: 2022-01-25
Payer: MEDICARE

## 2022-03-14 DIAGNOSIS — Z12.11 COLON CANCER SCREENING: ICD-10-CM

## 2022-08-18 ENCOUNTER — PES CALL (OUTPATIENT)
Dept: ADMINISTRATIVE | Facility: CLINIC | Age: 57
End: 2022-08-18
Payer: MEDICARE

## 2022-09-23 ENCOUNTER — PATIENT OUTREACH (OUTPATIENT)
Dept: ADMINISTRATIVE | Facility: HOSPITAL | Age: 57
End: 2022-09-23
Payer: MEDICARE

## 2022-11-03 ENCOUNTER — PES CALL (OUTPATIENT)
Dept: ADMINISTRATIVE | Facility: CLINIC | Age: 57
End: 2022-11-03
Payer: MEDICARE

## 2022-11-29 ENCOUNTER — PATIENT OUTREACH (OUTPATIENT)
Dept: ADMINISTRATIVE | Facility: HOSPITAL | Age: 57
End: 2022-11-29
Payer: MEDICARE

## 2022-11-29 NOTE — PROGRESS NOTES
Health Maintenance Due   Topic Date Due    Hepatitis C Screening  Never done    COVID-19 Vaccine (1) Never done    HIV Screening  Never done    Colorectal Cancer Screening  Never done    Shingles Vaccine (1 of 2) Never done    TETANUS VACCINE  09/05/2020    Pneumococcal Vaccines (Age 0-64) (2 - PCV) 03/02/2022    Mammogram  06/14/2022    Influenza Vaccine (1) Never done    LM for patient to call to schedule eye exam

## 2023-01-04 ENCOUNTER — PATIENT OUTREACH (OUTPATIENT)
Dept: ADMINISTRATIVE | Facility: HOSPITAL | Age: 58
End: 2023-01-04
Payer: MEDICARE

## 2023-02-22 ENCOUNTER — PATIENT OUTREACH (OUTPATIENT)
Dept: ADMINISTRATIVE | Facility: HOSPITAL | Age: 58
End: 2023-02-22
Payer: MEDICARE

## 2023-02-22 NOTE — LETTER
AUTHORIZATION FOR RELEASE OF   CONFIDENTIAL INFORMATION    Dear Dr. Higginbotham,    We are seeing Libby Gayle, date of birth 1965, in the clinic at [facility]. Jose De Souza MD is the patient's PCP. Libby Gayle has an outstanding lab/procedure at the time we reviewed her chart. In order to help keep her health information updated, she has authorized us to request the following medical record(s):        (  )  MAMMOGRAM                                      (  )  COLONOSCOPY      (  )  PAP SMEAR                                          (  )  OUTSIDE LAB RESULTS     (  )  DEXA SCAN                                          ( x )  EYE EXAM            (  )  FOOT EXAM                                          (  )  ENTIRE RECORD     (  )  OUTSIDE IMMUNIZATIONS                 (  )  _______________         Please fax records to Jose De Souza MD, [phone]     If you have any questions, please contact ABDI Mendez at [phone]           Patient Name: Libby Gayle  : 1965  Patient Phone #: [phone]

## 2024-07-11 ENCOUNTER — PATIENT OUTREACH (OUTPATIENT)
Dept: ADMINISTRATIVE | Facility: HOSPITAL | Age: 59
End: 2024-07-11

## 2024-07-11 NOTE — PROGRESS NOTES
[facility] panel list reviewed. Patient has not been seen by PCP since 3/2/2021. Patient has been seeing Dr. Nikolas De Souza removed as PCP

## 2025-02-28 PROBLEM — R22.9 LOCALIZED SOFT TISSUE SWELLING: Status: ACTIVE | Noted: 2025-02-28

## 2025-02-28 PROBLEM — S09.90XA CLOSED HEAD INJURY: Status: ACTIVE | Noted: 2025-02-28

## 2025-03-02 ENCOUNTER — PATIENT OUTREACH (OUTPATIENT)
Facility: OTHER | Age: 60
End: 2025-03-02